# Patient Record
Sex: FEMALE | Race: WHITE | URBAN - METROPOLITAN AREA
[De-identification: names, ages, dates, MRNs, and addresses within clinical notes are randomized per-mention and may not be internally consistent; named-entity substitution may affect disease eponyms.]

---

## 2020-11-14 ENCOUNTER — NEW PATIENT (OUTPATIENT)
Dept: URBAN - METROPOLITAN AREA CLINIC 37 | Facility: CLINIC | Age: 60
End: 2020-11-14
Payer: COMMERCIAL

## 2020-11-14 DIAGNOSIS — H25.13 AGE-RELATED NUCLEAR CATARACT, BILATERAL: ICD-10-CM

## 2020-11-14 DIAGNOSIS — H43.391 OTHER VITREOUS OPACITIES, RIGHT EYE: ICD-10-CM

## 2020-11-14 PROCEDURE — 92004 COMPRE OPH EXAM NEW PT 1/>: CPT | Performed by: OPTOMETRIST

## 2020-11-14 ASSESSMENT — INTRAOCULAR PRESSURE
OD: 13
OS: 13

## 2020-11-14 ASSESSMENT — VISUAL ACUITY
OS: 20/15
OD: 20/25

## 2021-11-15 ENCOUNTER — OFFICE VISIT (OUTPATIENT)
Dept: URBAN - METROPOLITAN AREA CLINIC 37 | Facility: CLINIC | Age: 61
End: 2021-11-15
Payer: COMMERCIAL

## 2021-11-15 DIAGNOSIS — H52.223 REGULAR ASTIGMATISM, BILATERAL: ICD-10-CM

## 2021-11-15 DIAGNOSIS — Z98.890 OTHER SPECIFIED POSTPROCEDURAL STATES: ICD-10-CM

## 2021-11-15 PROCEDURE — 99214 OFFICE O/P EST MOD 30 MIN: CPT | Performed by: OPTOMETRIST

## 2021-11-15 ASSESSMENT — VISUAL ACUITY
OS: 20/20
OD: 20/20

## 2021-11-15 ASSESSMENT — INTRAOCULAR PRESSURE
OD: 12
OS: 12

## 2021-11-15 NOTE — IMPRESSION/PLAN
Impression: Age-related nuclear cataract, bilateral: H25.13. Plan:  No treatment required. Cataract surgery not required.

## 2021-11-15 NOTE — IMPRESSION/PLAN
Impression: Other vitreous opacities, right eye: H43.391.  Plan: signs and symptoms of RD explained, RTC immediately

## 2022-12-08 ENCOUNTER — OFFICE VISIT (OUTPATIENT)
Dept: URBAN - METROPOLITAN AREA CLINIC 37 | Facility: CLINIC | Age: 62
End: 2022-12-08
Payer: COMMERCIAL

## 2022-12-08 DIAGNOSIS — H52.223 REGULAR ASTIGMATISM, BILATERAL: ICD-10-CM

## 2022-12-08 DIAGNOSIS — Z98.890 OTHER SPECIFIED POSTPROCEDURAL STATES: ICD-10-CM

## 2022-12-08 DIAGNOSIS — H25.13 AGE-RELATED NUCLEAR CATARACT, BILATERAL: Primary | ICD-10-CM

## 2022-12-08 DIAGNOSIS — H43.391 OTHER VITREOUS OPACITIES, RIGHT EYE: ICD-10-CM

## 2022-12-08 PROCEDURE — 99214 OFFICE O/P EST MOD 30 MIN: CPT | Performed by: OPTOMETRIST

## 2022-12-08 ASSESSMENT — VISUAL ACUITY
OS: 20/20
OD: 20/20

## 2022-12-08 ASSESSMENT — INTRAOCULAR PRESSURE
OS: 15
OD: 13

## 2022-12-08 NOTE — IMPRESSION/PLAN
Impression: Age-related nuclear cataract, bilateral: H25.13.  Plan: can consider TRAVON/IC8 in 1 year

## 2022-12-08 NOTE — IMPRESSION/PLAN
Impression: Age-related nuclear cataract, bilateral: H25.13. Plan:  Cataract surgery not required. No treatment required.

## 2022-12-08 NOTE — IMPRESSION/PLAN
Impression: Other vitreous opacities, right eye: H43.391.  Plan: signs and symptoms of RD explained, RTC immediately Tubo-ovarian abscess